# Patient Record
Sex: FEMALE | URBAN - METROPOLITAN AREA
[De-identification: names, ages, dates, MRNs, and addresses within clinical notes are randomized per-mention and may not be internally consistent; named-entity substitution may affect disease eponyms.]

---

## 2017-04-06 ENCOUNTER — TRANSFERRED RECORDS (OUTPATIENT)
Dept: HEALTH INFORMATION MANAGEMENT | Facility: CLINIC | Age: 32
End: 2017-04-06

## 2017-08-30 ENCOUNTER — TRANSFERRED RECORDS (OUTPATIENT)
Dept: HEALTH INFORMATION MANAGEMENT | Facility: CLINIC | Age: 32
End: 2017-08-30

## 2017-08-30 ENCOUNTER — MEDICAL CORRESPONDENCE (OUTPATIENT)
Dept: HEALTH INFORMATION MANAGEMENT | Facility: CLINIC | Age: 32
End: 2017-08-30

## 2017-09-14 ENCOUNTER — PRE VISIT (OUTPATIENT)
Dept: GASTROENTEROLOGY | Facility: CLINIC | Age: 32
End: 2017-09-14

## 2017-09-14 NOTE — TELEPHONE ENCOUNTER
1.  Date/reason for appt: 9/27/17 2PM Gastroparesis     2.  Referring provider:  [facility] [place]   3.  Call to patient (Yes / No - short description): No, pt was referred.   4.  Previous care at / records requested from:  [facility] Jt [place] - Faxed cover sheet to [facility]. [facility]

## 2017-09-15 NOTE — TELEPHONE ENCOUNTER
Records received from [facility].   Included  Office notes: 8/30/17(telehealth visit)  Other: referral     Missing/needed records: office notes?

## 2017-09-18 NOTE — TELEPHONE ENCOUNTER
Additional records received from [facility].   Included  Office notes: 4/6/17, 8/30/17(duplicate)    Missing/needed records:cholecystectomy 6/16/15, colonoscopy 8/31/15, upper gi endoscopy 7/2017, 8/2017, EGD 8/31/15, ED in [place]

## 2017-09-26 NOTE — TELEPHONE ENCOUNTER
Called pt to ask pt to arrive early for tomorrow's appt to sign PRITESH Forms. Pt does not have voicemail set up, was not able to leave a message. -2nd attempt

## 2017-09-27 ENCOUNTER — OFFICE VISIT (OUTPATIENT)
Dept: GASTROENTEROLOGY | Facility: CLINIC | Age: 32
End: 2017-09-27

## 2017-09-27 VITALS
SYSTOLIC BLOOD PRESSURE: 120 MMHG | DIASTOLIC BLOOD PRESSURE: 80 MMHG | HEART RATE: 78 BPM | OXYGEN SATURATION: 100 % | WEIGHT: 118 LBS

## 2017-09-27 DIAGNOSIS — R11.15 INTRACTABLE CYCLICAL VOMITING WITH NAUSEA: ICD-10-CM

## 2017-09-27 DIAGNOSIS — K31.84 GASTROPARESIS: ICD-10-CM

## 2017-09-27 DIAGNOSIS — E10.43 TYPE 1 DIABETES MELLITUS WITH DIABETIC AUTONOMIC NEUROPATHY (H): ICD-10-CM

## 2017-09-27 DIAGNOSIS — K59.00 CONSTIPATION, UNSPECIFIED CONSTIPATION TYPE: ICD-10-CM

## 2017-09-27 DIAGNOSIS — R11.15 INTRACTABLE CYCLICAL VOMITING WITH NAUSEA: Primary | ICD-10-CM

## 2017-09-27 LAB
ALBUMIN SERPL-MCNC: 2.6 G/DL (ref 3.4–5)
ALP SERPL-CCNC: 95 U/L (ref 40–150)
ALT SERPL W P-5'-P-CCNC: 16 U/L (ref 0–50)
ANION GAP SERPL CALCULATED.3IONS-SCNC: 6 MMOL/L (ref 3–14)
AST SERPL W P-5'-P-CCNC: 15 U/L (ref 0–45)
BASOPHILS # BLD AUTO: 0 10E9/L (ref 0–0.2)
BASOPHILS NFR BLD AUTO: 0.3 %
BILIRUB SERPL-MCNC: 0.5 MG/DL (ref 0.2–1.3)
BUN SERPL-MCNC: 5 MG/DL (ref 7–30)
CALCIUM SERPL-MCNC: 7.8 MG/DL (ref 8.5–10.1)
CHLORIDE SERPL-SCNC: 104 MMOL/L (ref 94–109)
CO2 SERPL-SCNC: 29 MMOL/L (ref 20–32)
CREAT SERPL-MCNC: 0.72 MG/DL (ref 0.52–1.04)
CRP SERPL-MCNC: <2.9 MG/L (ref 0–8)
DIFFERENTIAL METHOD BLD: ABNORMAL
EOSINOPHIL # BLD AUTO: 0.2 10E9/L (ref 0–0.7)
EOSINOPHIL NFR BLD AUTO: 2.8 %
ERYTHROCYTE [DISTWIDTH] IN BLOOD BY AUTOMATED COUNT: 14.2 % (ref 10–15)
ERYTHROCYTE [SEDIMENTATION RATE] IN BLOOD BY WESTERGREN METHOD: 28 MM/H (ref 0–20)
GFR SERPL CREATININE-BSD FRML MDRD: >90 ML/MIN/1.7M2
GLUCOSE SERPL-MCNC: 112 MG/DL (ref 70–99)
HBA1C MFR BLD: 9.3 % (ref 4.3–6)
HCT VFR BLD AUTO: 32.6 % (ref 35–47)
HGB BLD-MCNC: 9.9 G/DL (ref 11.7–15.7)
IMM GRANULOCYTES # BLD: 0 10E9/L (ref 0–0.4)
IMM GRANULOCYTES NFR BLD: 0.5 %
LIPASE SERPL-CCNC: 65 U/L (ref 73–393)
LYMPHOCYTES # BLD AUTO: 2.6 10E9/L (ref 0.8–5.3)
LYMPHOCYTES NFR BLD AUTO: 40.1 %
MCH RBC QN AUTO: 26.9 PG (ref 26.5–33)
MCHC RBC AUTO-ENTMCNC: 30.4 G/DL (ref 31.5–36.5)
MCV RBC AUTO: 89 FL (ref 78–100)
MONOCYTES # BLD AUTO: 0.4 10E9/L (ref 0–1.3)
MONOCYTES NFR BLD AUTO: 6.8 %
NEUTROPHILS # BLD AUTO: 3.2 10E9/L (ref 1.6–8.3)
NEUTROPHILS NFR BLD AUTO: 49.5 %
NRBC # BLD AUTO: 0 10*3/UL
NRBC BLD AUTO-RTO: 0 /100
PLATELET # BLD AUTO: 296 10E9/L (ref 150–450)
POTASSIUM SERPL-SCNC: 4.1 MMOL/L (ref 3.4–5.3)
PROT SERPL-MCNC: 6.1 G/DL (ref 6.8–8.8)
RBC # BLD AUTO: 3.68 10E12/L (ref 3.8–5.2)
SODIUM SERPL-SCNC: 139 MMOL/L (ref 133–144)
WBC # BLD AUTO: 6.4 10E9/L (ref 4–11)

## 2017-09-27 RX ORDER — PROMETHAZINE HYDROCHLORIDE 25 MG/1
25 TABLET ORAL EVERY 6 HOURS PRN
COMMUNITY

## 2017-09-27 RX ORDER — POLYETHYLENE GLYCOL 3350 17 G/17G
17 POWDER, FOR SOLUTION ORAL DAILY
Qty: 765 G | Refills: 3 | Status: SHIPPED | OUTPATIENT
Start: 2017-09-27

## 2017-09-27 RX ORDER — SUCRALFATE 1 G/1
1 TABLET ORAL 4 TIMES DAILY
COMMUNITY

## 2017-09-27 RX ORDER — HYDROCODONE BITARTRATE AND ACETAMINOPHEN 10; 325 MG/1; MG/1
1 TABLET ORAL EVERY 6 HOURS PRN
COMMUNITY

## 2017-09-27 ASSESSMENT — ENCOUNTER SYMPTOMS
NAUSEA: 1
JOINT SWELLING: 1
HOT FLASHES: 0
DECREASED APPETITE: 1
WEAKNESS: 1
ABDOMINAL PAIN: 1
SPEECH CHANGE: 0
VOMITING: 1
PARALYSIS: 0
EYE REDNESS: 0
WEIGHT LOSS: 1
DECREASED CONCENTRATION: 1
NERVOUS/ANXIOUS: 1
FLANK PAIN: 0
DIARRHEA: 1
NIGHT SWEATS: 1
MUSCLE WEAKNESS: 1
RECTAL PAIN: 0
BACK PAIN: 1
NAIL CHANGES: 1
LOSS OF CONSCIOUSNESS: 1
INSOMNIA: 1
BOWEL INCONTINENCE: 0
FEVER: 0
CHILLS: 1
WEIGHT GAIN: 0
DYSURIA: 0
BRUISES/BLEEDS EASILY: 1
FATIGUE: 1
DIZZINESS: 1
EYE PAIN: 0
SEIZURES: 0
NUMBNESS: 1
NECK PAIN: 1
TREMORS: 0
HALLUCINATIONS: 0
TINGLING: 1
DOUBLE VISION: 1
MEMORY LOSS: 0
MUSCLE CRAMPS: 1
MYALGIAS: 1
CONSTIPATION: 1
ARTHRALGIAS: 1
RECTAL BLEEDING: 0
PANIC: 1
DISTURBANCES IN COORDINATION: 1
EYE WATERING: 1
HEADACHES: 1
HEARTBURN: 1
ALTERED TEMPERATURE REGULATION: 1
INCREASED ENERGY: 1
POOR WOUND HEALING: 0
STIFFNESS: 1
BLOOD IN STOOL: 1
HEMATURIA: 0
EYE IRRITATION: 0
POLYPHAGIA: 1
DIFFICULTY URINATING: 1
SKIN CHANGES: 1
POLYDIPSIA: 1
SWOLLEN GLANDS: 0
DECREASED LIBIDO: 1
DEPRESSION: 1
BLOATING: 1
JAUNDICE: 0

## 2017-09-27 ASSESSMENT — PAIN SCALES - GENERAL: PAINLEVEL: NO PAIN (0)

## 2017-09-27 NOTE — MR AVS SNAPSHOT
After Visit Summary   9/27/2017    Yamileth Mcnamara    MRN: [id number]           Patient Information     Date Of Birth          1985        Visit Information        Provider Department      9/27/2017 2:00 PM Zamzam Coleman MD [facility]        Today's Diagnoses     Intractable cyclical vomiting with nausea    -  1    Gastroparesis        Constipation, unspecified constipation type        Type 1 diabetes mellitus with diabetic autonomic neuropathy (H)          Care Instructions    Thank you for coming today; it was a pleasure to meet you.     As discussed, our plan is below:  --Stop marijuana  --Blood work today  --Gastric emptying study (GES) ordered   --Nutrition referral     Obtain records from [facility]:   ---GES   ---Updated endoscopy reports and pathology reports   ---cross sectional imaging (CT, MRI)    --Continue to avoid narcotics    --Optimize diabetes management     --Avoid constipation with Miralax, titrated to daily bowel movements.       Return 6-8 weeks            Follow-ups after your visit        Follow-up notes from your care team     Return in about 8 weeks (around 11/22/2017).      Future tests that were ordered for you today     Open Future Orders        Priority Expected Expires Ordered    Hemoglobin A1c Routine  9/27/2018 9/27/2017    Comprehensive metabolic panel Routine  9/27/2018 9/27/2017    NM Gastric Emptying Routine  9/27/2018 9/27/2017    CRP inflammation Routine 9/27/2017 10/27/2017 9/27/2017    Erythrocyte sedimentation rate auto Routine 9/27/2017 10/27/2017 9/27/2017            Who to contact     Please call your clinic at [phone] to:    Ask questions about your health    Make or cancel appointments    Discuss your medicines    Learn about your test results    Speak to your doctor   If you have compliments or concerns about an experience at your clinic, or if you wish to file a complaint, please contact [facility]  [place] [facility] Patient Relations at [phone] or email us at [email or web address]         Additional Information About Your Visit        PBworks[employer] Information     [employer] gives you secure access to your electronic health record. If you see a primary care provider, you can also send messages to your care team and make appointments. If you have questions, please call your primary care clinic.  If you do not have a primary care provider, please call [phone] and they will assist you.      [employer] is an electronic gateway that provides easy, online access to your medical records. With [employer], you can request a clinic appointment, read your test results, renew a prescription or communicate with your care team.     To access your existing account, please contact your [facility] or call [phone] for assistance.        Care EveryWhere ID     This is your Care EveryWhere ID. This could be used by other organizations to access your [facility] medical records  [id number]        Your Vitals Were     Pulse Last Period Pulse Oximetry             78 09/25/2017 100%          Blood Pressure from Last 3 Encounters:   09/27/17 120/80    Weight from Last 3 Encounters:   09/27/17 53.5 kg (118 lb)              We Performed the Following     CBC WITH PLATELETS DIFFERENTIAL     LIPASE          Today's Medication Changes          These changes are accurate as of: 9/27/17  3:23 PM.  If you have any questions, ask your nurse or doctor.               Start taking these medicines.        Dose/Directions    polyethylene glycol powder   Commonly known as:  MIRALAX   Used for:  Constipation, unspecified constipation type   Started by:  Zamzam Coleman MD        Dose:  17 g   Take 17 g by mouth daily   Quantity:  765 g   Refills:  3            Where to get your medicines      These medications were sent to [facility] [address] [place] [address] [address] AT [facility] [address]  [address], [facility] [address]     Phone:  [phone]     polyethylene glycol powder                Primary Care Provider Office Phone # Fax #    Nikita Quevedo [phone]       [address] [address]        Equal Access to Services     TALON ISLAS : Hadii aad ku hadasho Soomaali, waaxda luqadaha, qaybta kaalmada adeegyada, waxay idiin hayaan adeeg khervinoralia lanimo . So [facility] [phone].    ATENCIÓN: Si habla español, tiene a larson disposición servicios gratuitos de asistencia lingüística. [facility] [phone].    We comply with applicable federal civil rights laws and [place] laws. We do not discriminate on the basis of race, color, national origin, age, disability sex, sexual orientation or gender identity.            Thank you!     Thank you for choosing [facility]  for your care. Our goal is always to provide you with excellent care. Hearing back from our patients is one way we can continue to improve our services. Please take a few minutes to complete the written survey that you may receive in the mail after your visit with us. Thank you!             Your Updated Medication List - Protect others around you: Learn how to safely use, store and throw away your medicines at www.disposemymeds.org.          This list is accurate as of: 9/27/17  3:23 PM.  Always use your most recent med list.                   Brand Name Dispense Instructions for use Diagnosis    ARIPIPRAZOLE PO      Take 5 mg by mouth daily        ASPIRIN EC PO      Take 81 mg by mouth daily        DULOXETINE HCL PO      Take 60 mg by mouth daily        HYDROcodone-acetaminophen  MG per tablet    NORCO     Take 1 tablet by mouth every 6 hours as needed for moderate to severe pain        insulin glargine 100 UNIT/ML injection    LANTUS     Inject 17 Units Subcutaneous At Bedtime        insulin lispro 100 UNIT/ML injection    HumaLOG     Inject  100 Units Subcutaneous 3 times daily (before meals)        LISINOPRIL PO      Take 20 mg by mouth daily        ONDANSETRON PO      Take 8 mg by mouth once        pantoprazole Susp suspension    PROTONIX     Take 40 mg by mouth daily        polyethylene glycol powder    MIRALAX    765 g    Take 17 g by mouth daily    Constipation, unspecified constipation type       PREGABALIN PO      Take 100 mg by mouth 3 times daily        promethazine 25 MG tablet    PHENERGAN     Take 25 mg by mouth every 6 hours as needed for nausea        sucralfate 1 GM tablet    CARAFATE     Take 1 g by mouth 4 times daily

## 2017-09-27 NOTE — PATIENT INSTRUCTIONS
Thank you for coming today; it was a pleasure to meet you.     As discussed, our plan is below:  --Stop marijuana  --Blood work today  --Gastric emptying study (GES) ordered   --Nutrition referral     Obtain records from [facility]:   ---GES   ---Updated endoscopy reports and pathology reports   ---cross sectional imaging (CT, MRI)    --Continue to avoid narcotics    --Optimize diabetes management     --Avoid constipation with Miralax, titrated to daily bowel movements.       Return 6-8 weeks

## 2017-09-27 NOTE — PROGRESS NOTES
GI CLINIC VISIT    CC/REFERRING MD:  Edna Waters  REASON FOR CONSULTATION:   Edna Waters for   Chief Complaint   Patient presents with     Gastrointestinal Problem     gastroparesis     ASSESSMENT/PLAN:  Ms. Mcnamara is a 33 yo woman with cyclic vomiting and gastroparesis in the setting of uncontrolled DM type 1, daily marijuana use and constipation.  I am concerned that she may have cannabis hyperemesis syndrome.  For her gastroparesis, DM control should be optimized.      # Possible cannabis hyperemesis syndrome  # Gastroparesis in the setting of poorly controlled diabetes  # Constipation   # Weight loss   --Labs today: CBC, CMP, ESR, CRP, HbA1c, IgA, IgA TTG   --Stop marijuana use  --Optimize glucose control; patient is currently planning to get an insulin pump   --Daily Miralax; titrate to 1-2 soft BMs per day  --GES ordered   --Nutrition referral for gastroparesis diet   --Continue to avoid narcotics    RTC: Return in about 8 weeks (around 11/22/2017).     A total of 60 minutes, face to face, was spent with this patient, >50% of which was counseling regarding the above delineated issues.    Zamzam Coleman MD   of Medicine  Division of Gastroenterology, Hepatology and Nutrition  [facility]    HPI  Ms. Mcnamara is a 33 yo woman who presents for evaluation of gastroparesis x 5 years.  Two years ago, she developed vomiting with some blood; occasionally vomitus is initially bloody and other times, it becomes bloody after several episodes of retching.  Had MWT in the past. Vomiting was initially twice per year, then progressively increased in frequency to twice per month.  Has required many hospital stays. She was just discharged from a one night hospital stay in [facility] in [place] for vomiting.  Per discharge records, pain medications were increased from 5-325 mg to  mg.  Treated with IVF, IV anti-emetics.     She reports a weight loss of 10 lb over the past year.    DM type 1 is complicated by peripheral neuropathy and is not under good control; HbA1c was 9.5 most recently.     Current medications:  Hydrocodone-acetaminophan every 6 hr prn (started yesterday after discharge). #15  abilify  ASA 81  Insulin (lispro and glargine, lantus) via pens; considering getting a pump.   Cymbalta   lisinopril  MVI   Pantoprazole 40 mg BID  Lyrica  Phenergan; takes once per week or so   Carafate   Reports not taking any narcotics at home except when prescribed briefly after a hospital stay.     Diet: follows small, frequent meals   Yogurt, milk, rice, pudding, jello, ensure, boost   Avoids meat, breads, cheese       Smokes marijuana daily 1-2 times per day x 1 year.   In her 20s, smoked recreationally, quit for two years and restarted about 1 year ago on a daily basis.    Endorses constipation. Has a BM every 3rd day. Takes colace 3 times per week.   Occasional tension HA; no migraines.     Outside records:   July 2017 GES 69% retailed at 4 hours   EGD 7/10/2017 showed 5 cm distal esophagitis; otherwise normal  EGD 8/23/2017 showed normal esophagus, retained food in stomach, mild antritis  CT a/p 6/30/2017: no mention of GIT (indication: suspect renal calculi)      ROS:    No fevers or chills  + weight loss  No blurry vision, double vision or change in vision  No sore throat  No lymphadenopathy  No headache, paraesthesias, or weakness in a limb  No shortness of breath or wheezing  No chest pain or pressure  No arthralgias or myalgias  No rashes or skin changes  No odynophagia or dysphagia  No BRBPR, hematochezia, melena  No dysuria, frequency or urgency  No hot/cold intolerance or polyria  No anxiety or depression    PERTINENT PAST MEDICAL HISTORY:  Past Medical History:   Diagnosis Date     Anxiety 09/27/2017     Bipolar 1 disorder (H) 09/27/2017     Depression 09/27/2017     Diabetes type 1, uncontrolled (H) 09/27/2017     Menorrhagia with irregular cycle 09/27/2017     MGUS (monoclonal  gammopathy of unknown significance) 09/27/2017     Myocardial infarction acute (H) 09/27/2017    RCA dissection with MI, age 21       PREVIOUS SURGERIES:  Past Surgical History:   Procedure Laterality Date     CHOLECYSTECTOMY         PREVIOUS ENDOSCOPY:  EGD 1/17/2017: no food. Normal except distal 5 cm esophagus showed ulcerative esophagitis.  No biopsies taken.   EGD 7/10/2017: 5 cm distal esophagitis; otherwise normal  EGD 8/23/2017: normal esophagus, retained food in stomach, mild antritis    ALLERGIES:     Allergies   Allergen Reactions     Penicillin G Unknown     Tramadol Diarrhea       PERTINENT MEDICATIONS:    Current Outpatient Prescriptions:      HYDROcodone-acetaminophen (NORCO)  MG per tablet, Take 1 tablet by mouth every 6 hours as needed for moderate to severe pain, Disp: , Rfl:      ARIPIPRAZOLE PO, Take 5 mg by mouth daily, Disp: , Rfl:      ASPIRIN EC PO, Take 81 mg by mouth daily, Disp: , Rfl:      DULOXETINE HCL PO, Take 60 mg by mouth daily, Disp: , Rfl:      insulin glargine (LANTUS) 100 UNIT/ML injection, Inject 17 Units Subcutaneous At Bedtime, Disp: , Rfl:      insulin lispro (HUMALOG) 100 UNIT/ML injection, Inject 100 Units Subcutaneous 3 times daily (before meals), Disp: , Rfl:      LISINOPRIL PO, Take 20 mg by mouth daily, Disp: , Rfl:      ONDANSETRON PO, Take 8 mg by mouth once, Disp: , Rfl:      pantoprazole (PROTONIX) SUSP suspension, Take 40 mg by mouth daily, Disp: , Rfl:      PREGABALIN PO, Take 100 mg by mouth 3 times daily, Disp: , Rfl:      promethazine (PHENERGAN) 25 MG tablet, Take 25 mg by mouth every 6 hours as needed for nausea, Disp: , Rfl:      sucralfate (CARAFATE) 1 GM tablet, Take 1 g by mouth 4 times daily, Disp: , Rfl:      polyethylene glycol (MIRALAX) powder, Take 17 g by mouth daily, Disp: 765 g, Rfl: 3    SOCIAL HISTORY:  Social History     Social History     Marital status: Single     Spouse name: N/A     Number of children: N/A     Years of education: N/A      Occupational History     Not on file.     Social History Main Topics     Smoking status: Current Every Day Smoker     Types: Cigarettes     Smokeless tobacco: Never Used     Alcohol use No     Drug use: Yes     Special: Marijuana      Comment: daily use      Sexual activity: Not on file     Other Topics Concern     Not on file     Social History Narrative     No narrative on file     FAMILY HISTORY:  Family History   Problem Relation Age of Onset     DIABETES Maternal Grandmother      Thyroid Disease Maternal Grandmother      Hyperlipidemia Maternal Grandmother      Coronary Artery Disease Maternal Grandmother      DIABETES Sister      Breast Cancer Maternal Aunt      Breast Cancer Paternal Aunt      Cervical Cancer Paternal Aunt      PHYSICAL EXAMINATION:  Constitutional: aaox3, cooperative, pleasant, not dyspneic/diaphoretic, no acute distress  Vitals reviewed: /80  Pulse 78  Wt 53.5 kg (118 lb)  LMP 09/25/2017  SpO2 100%  Wt:   Wt Readings from Last 2 Encounters:   09/27/17 53.5 kg (118 lb)      Eyes: Sclera anicteric/injected  Ears/nose/mouth/throat: Normal oropharynx without ulcers or exudate, mucus membranes moist, hearing intact  Neck: supple, thyroid normal size  CV: No edema  Respiratory: Unlabored breathing  Lymph: No axillary, submandibular, supraclavicular or inguinal lymphadenopathy  Abd: Nondistended, hypoactive BS, no hepatosplenomegaly, moderate TTP in epigastric and central abdomen, no RT or IG. Worse with muscle flexion. Some tenderness inferoumbilically as well. No peritoneal signs  Skin: warm, perfused, no jaundice  Psych: Normal affect  MSK: Normal gait

## 2017-09-27 NOTE — TELEPHONE ENCOUNTER
Pt will need to sign PRITESH forms once she arrives at her appt. Not able to reach pt or LM for pt regarding outside recs.

## 2017-09-27 NOTE — LETTER
9/27/2017       RE: Yamileth Mcnamara  [id number] [address] [facility] [address]     Dear Colleague,    Thank you for referring your patient, Yamileth Mcnamara, to the [facility]STRO[facility] IBD CLINIC at [facility]. Please see a copy of my visit note below.    GI CLINIC VISIT    CC/REFERRING MD:  Edna Waters  REASON FOR CONSULTATION:   Edna Waters for   Chief Complaint   Patient presents with     Gastrointestinal Problem     gastroparesis     ASSESSMENT/PLAN:  Ms. Mcnamara is a 31 yo woman with cyclic vomiting and gastroparesis in the setting of uncontrolled DM type 1, daily marijuana use and constipation.  I am concerned that she may have cannabis hyperemesis syndrome.  For her gastroparesis, DM control should be optimized.      # Possible cannabis hyperemesis syndrome  # Gastroparesis in the setting of poorly controlled diabetes  # Constipation   # Weight loss   --Labs today: CBC, CMP, ESR, CRP, HbA1c, IgA, IgA TTG   --Stop marijuana use  --Optimize glucose control; patient is currently planning to get an insulin pump   --Daily Miralax; titrate to 1-2 soft BMs per day  --GES ordered   --Nutrition referral for gastroparesis diet   --Continue to avoid narcotics    RTC: Return in about 8 weeks (around 11/22/2017).     A total of 60 minutes, face to face, was spent with this patient, >50% of which was counseling regarding the above delineated issues.    Zamzam Coleman MD   of Medicine  Division of Gastroenterology, Hepatology and Nutrition  [facility]    HPI  Ms. Mcnamara is a 31 yo woman who presents for evaluation of gastroparesis x 5 years.  Two years ago, she developed vomiting with some blood; occasionally vomitus is initially bloody and other times, it becomes bloody after several episodes of retching.  Had MWT in the past. Vomiting was initially twice per year, then progressively increased in frequency to twice per month.   Has required many hospital stays. She was just discharged from a one night hospital stay in [facility] in [place] for vomiting.  Per discharge records, pain medications were increased from 5-325 mg to  mg.  Treated with IVF, IV anti-emetics.     She reports a weight loss of 10 lb over the past year.   DM type 1 is complicated by peripheral neuropathy and is not under good control; HbA1c was 9.5 most recently.     Current medications:  Hydrocodone-acetaminophan every 6 hr prn (started yesterday after discharge). #15  abilify  ASA 81  Insulin (lispro and glargine, lantus) via pens; considering getting a pump.   Cymbalta   lisinopril  MVI   Pantoprazole 40 mg BID  Lyrica  Phenergan; takes once per week or so   Carafate   Reports not taking any narcotics at home except when prescribed briefly after a hospital stay.     Diet: follows small, frequent meals   Yogurt, milk, rice, pudding, jello, ensure, boost   Avoids meat, breads, cheese       Smokes marijuana daily 1-2 times per day x 1 year.   In her 20s, smoked recreationally, quit for two years and restarted about 1 year ago on a daily basis.    Endorses constipation. Has a BM every 3rd day. Takes colace 3 times per week.   Occasional tension HA; no migraines.     Outside records:   July 2017 GES 69% retailed at 4 hours   EGD 7/10/2017 showed 5 cm distal esophagitis; otherwise normal  EGD 8/23/2017 showed normal esophagus, retained food in stomach, mild antritis  CT a/p 6/30/2017: no mention of GIT (indication: suspect renal calculi)      ROS:    No fevers or chills  + weight loss  No blurry vision, double vision or change in vision  No sore throat  No lymphadenopathy  No headache, paraesthesias, or weakness in a limb  No shortness of breath or wheezing  No chest pain or pressure  No arthralgias or myalgias  No rashes or skin changes  No odynophagia or dysphagia  No BRBPR, hematochezia, melena  No dysuria, frequency or urgency  No hot/cold intolerance or polyria  No  anxiety or depression    PERTINENT PAST MEDICAL HISTORY:  Past Medical History:   Diagnosis Date     Anxiety 09/27/2017     Bipolar 1 disorder (H) 09/27/2017     Depression 09/27/2017     Diabetes type 1, uncontrolled (H) 09/27/2017     Menorrhagia with irregular cycle 09/27/2017     MGUS (monoclonal gammopathy of unknown significance) 09/27/2017     Myocardial infarction acute (H) 09/27/2017    RCA dissection with MI, age 21       PREVIOUS SURGERIES:  Past Surgical History:   Procedure Laterality Date     CHOLECYSTECTOMY         PREVIOUS ENDOSCOPY:  EGD 1/17/2017: no food. Normal except distal 5 cm esophagus showed ulcerative esophagitis.  No biopsies taken.   EGD 7/10/2017: 5 cm distal esophagitis; otherwise normal  EGD 8/23/2017: normal esophagus, retained food in stomach, mild antritis    ALLERGIES:     Allergies   Allergen Reactions     Penicillin G Unknown     Tramadol Diarrhea       PERTINENT MEDICATIONS:    Current Outpatient Prescriptions:      HYDROcodone-acetaminophen (NORCO)  MG per tablet, Take 1 tablet by mouth every 6 hours as needed for moderate to severe pain, Disp: , Rfl:      ARIPIPRAZOLE PO, Take 5 mg by mouth daily, Disp: , Rfl:      ASPIRIN EC PO, Take 81 mg by mouth daily, Disp: , Rfl:      DULOXETINE HCL PO, Take 60 mg by mouth daily, Disp: , Rfl:      insulin glargine (LANTUS) 100 UNIT/ML injection, Inject 17 Units Subcutaneous At Bedtime, Disp: , Rfl:      insulin lispro (HUMALOG) 100 UNIT/ML injection, Inject 100 Units Subcutaneous 3 times daily (before meals), Disp: , Rfl:      LISINOPRIL PO, Take 20 mg by mouth daily, Disp: , Rfl:      ONDANSETRON PO, Take 8 mg by mouth once, Disp: , Rfl:      pantoprazole (PROTONIX) SUSP suspension, Take 40 mg by mouth daily, Disp: , Rfl:      PREGABALIN PO, Take 100 mg by mouth 3 times daily, Disp: , Rfl:      promethazine (PHENERGAN) 25 MG tablet, Take 25 mg by mouth every 6 hours as needed for nausea, Disp: , Rfl:      sucralfate (CARAFATE) 1  GM tablet, Take 1 g by mouth 4 times daily, Disp: , Rfl:      polyethylene glycol (MIRALAX) powder, Take 17 g by mouth daily, Disp: 765 g, Rfl: 3    SOCIAL HISTORY:  Social History     Social History     Marital status: Single     Spouse name: N/A     Number of children: N/A     Years of education: N/A     Occupational History     Not on file.     Social History Main Topics     Smoking status: Current Every Day Smoker     Types: Cigarettes     Smokeless tobacco: Never Used     Alcohol use No     Drug use: Yes     Special: Marijuana      Comment: daily use      Sexual activity: Not on file     Other Topics Concern     Not on file     Social History Narrative     No narrative on file     FAMILY HISTORY:  Family History   Problem Relation Age of Onset     DIABETES Maternal Grandmother      Thyroid Disease Maternal Grandmother      Hyperlipidemia Maternal Grandmother      Coronary Artery Disease Maternal Grandmother      DIABETES Sister      Breast Cancer Maternal Aunt      Breast Cancer Paternal Aunt      Cervical Cancer Paternal Aunt      PHYSICAL EXAMINATION:  Constitutional: aaox3, cooperative, pleasant, not dyspneic/diaphoretic, no acute distress  Vitals reviewed: /80  Pulse 78  Wt 53.5 kg (118 lb)  LMP 09/25/2017  SpO2 100%  Wt:   Wt Readings from Last 2 Encounters:   09/27/17 53.5 kg (118 lb)      Eyes: Sclera anicteric/injected  Ears/nose/mouth/throat: Normal oropharynx without ulcers or exudate, mucus membranes moist, hearing intact  Neck: supple, thyroid normal size  CV: No edema  Respiratory: Unlabored breathing  Lymph: No axillary, submandibular, supraclavicular or inguinal lymphadenopathy  Abd: Nondistended, hypoactive BS, no hepatosplenomegaly, moderate TTP in epigastric and central abdomen, no RT or IG. Worse with muscle flexion. Some tenderness inferoumbilically as well. No peritoneal signs  Skin: warm, perfused, no jaundice  Psych: Normal affect  MSK: Normal gait    Again, thank you for  allowing me to participate in the care of your patient.      Sincerely,    Zamzam Coleman MD

## 2017-09-27 NOTE — NURSING NOTE
Chief Complaint   Patient presents with     Gastrointestinal Problem     gastroparesis       Vitals:    09/27/17 1419   BP: 120/80   Pulse: 78   SpO2: 100%   Weight: 118 lb       There is no height or weight on file to calculate BMI.      Raúl ADAMS

## 2017-09-29 ENCOUNTER — CARE COORDINATION (OUTPATIENT)
Dept: GASTROENTEROLOGY | Facility: CLINIC | Age: 32
End: 2017-09-29

## 2017-09-29 NOTE — PROGRESS NOTES
Attempted to contact patient at number provided. Not an active number.     Sent [email or web address] message as well.

## 2017-10-03 ENCOUNTER — CARE COORDINATION (OUTPATIENT)
Dept: GASTROENTEROLOGY | Facility: CLINIC | Age: 32
End: 2017-10-03

## 2017-10-03 NOTE — PROGRESS NOTES
Called to discuss follow up. Patient number isn't in service.                As discussed, our plan is below:      --Blood work today   --Gastric emptying study (GES) ordered   --Nutrition referral   -follow up gI

## 2018-01-21 ENCOUNTER — HEALTH MAINTENANCE LETTER (OUTPATIENT)
Age: 33
End: 2018-01-21

## 2020-03-11 ENCOUNTER — HEALTH MAINTENANCE LETTER (OUTPATIENT)
Age: 35
End: 2020-03-11

## 2020-12-27 ENCOUNTER — HEALTH MAINTENANCE LETTER (OUTPATIENT)
Age: 35
End: 2020-12-27

## 2021-04-25 ENCOUNTER — HEALTH MAINTENANCE LETTER (OUTPATIENT)
Age: 36
End: 2021-04-25

## 2021-10-09 ENCOUNTER — HEALTH MAINTENANCE LETTER (OUTPATIENT)
Age: 36
End: 2021-10-09

## 2022-05-21 ENCOUNTER — HEALTH MAINTENANCE LETTER (OUTPATIENT)
Age: 37
End: 2022-05-21

## 2022-09-17 ENCOUNTER — HEALTH MAINTENANCE LETTER (OUTPATIENT)
Age: 37
End: 2022-09-17

## 2023-06-04 ENCOUNTER — HEALTH MAINTENANCE LETTER (OUTPATIENT)
Age: 38
End: 2023-06-04